# Patient Record
Sex: FEMALE | Race: BLACK OR AFRICAN AMERICAN
[De-identification: names, ages, dates, MRNs, and addresses within clinical notes are randomized per-mention and may not be internally consistent; named-entity substitution may affect disease eponyms.]

---
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
## 2024-02-13 NOTE — PDOC1
OB - History


Hx of Present Pregnancy


Prenatal Care:  Good Care


Ultrasounds:  Normal mid trimester US


Obstetrical Complications:  None


Medical Complications:  None





Past Family/Social History


*


Past Medical, Surgical, Family and Obstetric Histories reviewed from prenatal 

chart.


Rubella:  Immune


RPR/VDRL:  Negative


GBS Status:  Negative


HBsAG:  Negative





OB - Chief Complaint & HPI


Date of Admission:


Date of Admission:  Dec 5, 2020 at 10:29


Chief Complaint/History


:  2


Para:  0


EGA:  38


Reason for admission:  active labor, rupture of membranes


Admission Nurse Assessment Rev:  Yes





OB - Admission Exam


Physical Exam


Vitals:





                          VS - Last 72 Hours, by Label








  Date Time  Temp Pulse Resp B/P (MAP) Pulse Ox O2 Delivery O2 Flow Rate FiO2


 


20 13:10   18  100 Room Air  


 


20 12:40   18   Room Air  


 


20 11:11 98.1 71 20 113/66 (82)    





 98.1       








HEENT:  Normal


Heart:  Regular Rate


Lungs:  Clear


Abdomen:  Gravid, Non tender, Soft


Extremities:  Edema


Reflexes:  Normal


Cervical Dilatation:  3cm


Effacement:  75%


Station:  -2


Membranes:  Ruptured


Amniotic Fluid:  Clear


Accelerations:  Accelerations Present


Decelerations:  No decelerations


Contractions on Admission:  6-10 Minutes Apart


Intensity:  Moderate


Text


A: 38 wks IUP


    SROM


P: Admit labor management.











KYRA SILVA Jr, MD           Dec 5, 2020 14:06 Quality 226: Preventive Care And Screening: Tobacco Use: Screening And Cessation Intervention: Patient screened for tobacco use and is an ex/non-smoker Detail Level: Detailed